# Patient Record
Sex: FEMALE | Race: BLACK OR AFRICAN AMERICAN | NOT HISPANIC OR LATINO | URBAN - METROPOLITAN AREA
[De-identification: names, ages, dates, MRNs, and addresses within clinical notes are randomized per-mention and may not be internally consistent; named-entity substitution may affect disease eponyms.]

---

## 2021-11-07 ENCOUNTER — EMERGENCY (EMERGENCY)
Facility: HOSPITAL | Age: 26
LOS: 0 days | Discharge: HOME | End: 2021-11-07
Payer: MEDICAID

## 2021-11-07 VITALS
SYSTOLIC BLOOD PRESSURE: 117 MMHG | HEART RATE: 84 BPM | OXYGEN SATURATION: 100 % | HEIGHT: 66 IN | RESPIRATION RATE: 15 BRPM | DIASTOLIC BLOOD PRESSURE: 74 MMHG | TEMPERATURE: 98 F | WEIGHT: 160.06 LBS

## 2021-11-07 DIAGNOSIS — Z23 ENCOUNTER FOR IMMUNIZATION: ICD-10-CM

## 2021-11-07 DIAGNOSIS — L02.411 CUTANEOUS ABSCESS OF RIGHT AXILLA: ICD-10-CM

## 2021-11-07 DIAGNOSIS — F12.90 CANNABIS USE, UNSPECIFIED, UNCOMPLICATED: ICD-10-CM

## 2021-11-07 LAB — HCG UR QL: NEGATIVE — SIGNIFICANT CHANGE UP

## 2021-11-07 PROCEDURE — 99284 EMERGENCY DEPT VISIT MOD MDM: CPT

## 2021-11-07 RX ORDER — CEPHALEXIN 500 MG
1 CAPSULE ORAL
Qty: 28 | Refills: 0
Start: 2021-11-07 | End: 2021-11-13

## 2021-11-07 RX ORDER — ACETAMINOPHEN 500 MG
650 TABLET ORAL ONCE
Refills: 0 | Status: COMPLETED | OUTPATIENT
Start: 2021-11-07 | End: 2021-11-07

## 2021-11-07 RX ORDER — TETANUS TOXOID, REDUCED DIPHTHERIA TOXOID AND ACELLULAR PERTUSSIS VACCINE, ADSORBED 5; 2.5; 8; 8; 2.5 [IU]/.5ML; [IU]/.5ML; UG/.5ML; UG/.5ML; UG/.5ML
0.5 SUSPENSION INTRAMUSCULAR ONCE
Refills: 0 | Status: COMPLETED | OUTPATIENT
Start: 2021-11-07 | End: 2021-11-07

## 2021-11-07 RX ORDER — CEPHALEXIN 500 MG
500 CAPSULE ORAL ONCE
Refills: 0 | Status: COMPLETED | OUTPATIENT
Start: 2021-11-07 | End: 2021-11-07

## 2021-11-07 RX ORDER — LIDOCAINE HCL 20 MG/ML
5 VIAL (ML) INJECTION ONCE
Refills: 0 | Status: COMPLETED | OUTPATIENT
Start: 2021-11-07 | End: 2021-11-07

## 2021-11-07 RX ORDER — ACETAMINOPHEN 500 MG
1 TABLET ORAL
Qty: 28 | Refills: 0
Start: 2021-11-07 | End: 2021-11-13

## 2021-11-07 RX ADMIN — TETANUS TOXOID, REDUCED DIPHTHERIA TOXOID AND ACELLULAR PERTUSSIS VACCINE, ADSORBED 0.5 MILLILITER(S): 5; 2.5; 8; 8; 2.5 SUSPENSION INTRAMUSCULAR at 12:06

## 2021-11-07 RX ADMIN — Medication 650 MILLIGRAM(S): at 12:00

## 2021-11-07 RX ADMIN — Medication 5 MILLILITER(S): at 13:26

## 2021-11-07 RX ADMIN — Medication 650 MILLIGRAM(S): at 11:59

## 2021-11-07 RX ADMIN — Medication 500 MILLIGRAM(S): at 11:59

## 2021-11-07 NOTE — ED PROVIDER NOTE - NSFOLLOWUPCLINICS_GEN_ALL_ED_FT
[facility]  General Surgery  [address] [place] [address]  Phone: [phone]  Fax:   Follow Up Time: 1-3 Days    [facility]  General Surgery  [address] [facility] [facility] [address]rd [place] [address]  Phone: [phone]  Fax:   Follow Up Time: 1-3 Days    [facility]  Surgery  [address] [place] [address]  Phone: [phone]  Fax: [phone]  Follow Up Time: 1-3 Days    [facility]  Wound Care  [address] [place] [address]  Phone: [phone]  Fax: [phone]  Follow Up Time: 1-3 Days

## 2021-11-07 NOTE — ED ADULT NURSE NOTE - OBJECTIVE STATEMENT
Patient reports recurring draining abscess with pain to right arm axilla x 2 days. + drainage, denies fevers, chills

## 2021-11-07 NOTE — ED PROVIDER NOTE - NSFOLLOWUPINSTRUCTIONS_ED_ALL_ED_FT
Please schedule to follow up within 3 days for abscess     [facility] - Primary Care   Primary Care  [address] [facility] [place] [address]  Phone: [phone]  Fax:     please take tylenol 325 mg every 4 hours as needed for pain    please take keflex 500 mg 4 times daily for 7 days     please return to the ED for worsening symptoms Please schedule to follow up within 3 days for abscess     [facility]  General Surgery  [address] [place] [address]  Phone: [phone]  Fax:   Follow Up Time: 1-3 Days    [facility]  General Surgery  [address] [facility] - 3rd [place] [address]  Phone: [phone]  Fax:   Follow Up Time: 1-3 Days    [facility]  Surgery  [address] [place] [address]  Phone: [phone]  Fax: [phone]  Follow Up Time: 1-3 Days    [facility]  Wound Care  [address] [place] [address]  Phone: [phone]  Fax: [phone]  Follow Up Time: 1-3 Days    [facility] - Primary Care   Primary Care  [address] [place] [address]  Phone: [phone]  Fax:     please take tylenol 325 mg every 4 hours as needed for pain    please take keflex 500 mg 4 times daily for 7 days     please return to the ED for worsening symptoms

## 2021-11-07 NOTE — ED PROVIDER NOTE - NSFOLLOWUPCLINICSTOKEN_GEN_ALL_ED_FT
629097:1-3 Days|| ||00\01||False;351336:1-3 Days|| ||00\01||False;132014:1-3 Days|| ||00\01||False;933580:1-3 Days|| ||00\01||False;

## 2021-11-07 NOTE — ED PROVIDER NOTE - PHYSICAL EXAMINATION
General appearance AAox3 nontoxic looking afebrile sitting in bed in NAD, breathing comfortable, speak full sentence, no tripoding  Head : NCAT, no facial swelling, no bruise, no laceration, non TTP,  EYE : b/l eyes EOMI/PERRL, no nystagmus.   Mouth : Oropharynx moist and patent, no swelling, no edema, no tongue swelling, uvula midline. no exudate, no rash, no lesion   Neck : no cervical/paracervical spine tenderness, no meningeal signs, no neck rigidity, no lymph node swelling. FROM, no body step off  chest : no bruise, no deformity, non TTP, chest expanding equally b/l, no crepitus, no swelling,   lung : CTAB no w/r/r  abdomen : soft non tender, no distended, + BS x 4 quadrant, no guarding, no CVA tenderness, no organomegaly  spine :  moving all extremity equally b/l skin intact, dry not warm to touch. no bruise, no deformity, no bony step off, no erythema, no swelling, no infection. non TTP. FROM, no sensory deficit, distal pulse intact, cap refill < 2 second, able to bare weight. strength 5/5   left axilla : 4x3 cm localized swelling centrally soft fluctuant with hard surrounding at the base. 1 cm opening at 6 oclock with positive yellow foul odor purulent discharge  skin dry not warm to touch. no bruise, no deformity, no bony step off. FROM, no sensory deficit, distal pulse intact, cap refill < 2 second, strength 5/5

## 2021-11-07 NOTE — ED PROVIDER NOTE - CLINICAL SUMMARY MEDICAL DECISION MAKING FREE TEXT BOX
25 y/o female UCG pending with pMhx right axillary abscess x 5 years ago, chronic marijuana smoker  presented to the ED with complaint of right armpit abscess x 2 day    imp right axillary abscess vs hydradenitis     plan  Ucg pending   tylenol   lidocaine w/o epi   I&D   keflex   tetanus   surgery referral   patient education   reassess

## 2021-11-07 NOTE — ED PROVIDER NOTE - OBJECTIVE STATEMENT
25 y/o female UCG pending with pMhx right axillary abscess x 5 years ago, chronic marijuana smoker  presented to the ED with complaint of right armpit abscess x 2 day with associated purulent drainage. denies fever, chills lost of motion, decrease sensory, numbness, tingling,. patient states " 5 years ago I went to [facility] to the an abscess drained, I cut it open and since then I never return back  for follow up.

## 2021-11-07 NOTE — ED PROVIDER NOTE - PATIENT PORTAL LINK FT
You can access the FollowMyHealth Patient Portal offered by [facility] by registering at the following website: [email or web address]. By joining [employer]’s FollowMyHealth portal, you will also be able to view your health information using other applications (apps) compatible with our system.

## 2021-11-09 ENCOUNTER — EMERGENCY (EMERGENCY)
Facility: HOSPITAL | Age: 26
LOS: 0 days | Discharge: HOME | End: 2021-11-10
Attending: STUDENT IN AN ORGANIZED HEALTH CARE EDUCATION/TRAINING PROGRAM
Payer: MEDICAID

## 2021-11-09 VITALS
OXYGEN SATURATION: 98 % | HEIGHT: 66 IN | HEART RATE: 103 BPM | TEMPERATURE: 98 F | RESPIRATION RATE: 20 BRPM | DIASTOLIC BLOOD PRESSURE: 70 MMHG | SYSTOLIC BLOOD PRESSURE: 106 MMHG | WEIGHT: 160.06 LBS

## 2021-11-09 DIAGNOSIS — Z48.01 ENCOUNTER FOR CHANGE OR REMOVAL OF SURGICAL WOUND DRESSING: ICD-10-CM

## 2021-11-09 PROCEDURE — 99282 EMERGENCY DEPT VISIT SF MDM: CPT

## 2021-11-09 NOTE — ED ADULT NURSE NOTE - NS ED NURSE RECORD ANOTHER VITAL SIGN
Called patient about his appointment with  Dr Darnell Guzman office, could not reach patient about the, appointment left message on patient voice mail, also mailing appointment to patient at current address. Yes

## 2021-11-09 NOTE — ED ADULT NURSE NOTE - OBJECTIVE STATEMENT
pt presented to [facility], AOX4 and ambulatory, with complaints of wound check. Pt states she has minimal pa8in and drainage.

## 2021-11-09 NOTE — ED PROVIDER NOTE - CLINICAL SUMMARY MEDICAL DECISION MAKING FREE TEXT BOX
pt presented for wound check, pt is s/p I and D of a right axillary abscess, no packing in place, area looks clean, no discharge, irrigated with normal saline and covered with 4x 4, pt told to continue to cover and change once daily x 7 days, home care instructions provided

## 2021-11-09 NOTE — ED PROVIDER NOTE - OBJECTIVE STATEMENT
26 year old female presents today for wound check, pt had a right axilla abscess I and D and packing placed, pt states that the packing fell out yesterday, pt denies pain or fever
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
## 2021-11-09 NOTE — ED PROVIDER NOTE - PATIENT PORTAL LINK FT
You can access the FollowMyHealth Patient Portal offered by [facility] by registering at the following website: [email or web address]. By joining [employer]’s FollowMyHealth portal, you will also be able to view your health information using other applications (apps) compatible with our system.

## 2022-06-13 ENCOUNTER — EMERGENCY (EMERGENCY)
Facility: HOSPITAL | Age: 27
LOS: 0 days | Discharge: HOME | End: 2022-06-13
Attending: STUDENT IN AN ORGANIZED HEALTH CARE EDUCATION/TRAINING PROGRAM
Payer: MEDICAID

## 2022-06-13 VITALS
DIASTOLIC BLOOD PRESSURE: 76 MMHG | OXYGEN SATURATION: 100 % | HEIGHT: 66 IN | TEMPERATURE: 98 F | SYSTOLIC BLOOD PRESSURE: 115 MMHG | HEART RATE: 81 BPM | RESPIRATION RATE: 17 BRPM | WEIGHT: 164.91 LBS

## 2022-06-13 DIAGNOSIS — R68.84 JAW PAIN: ICD-10-CM

## 2022-06-13 DIAGNOSIS — K08.89 OTHER SPECIFIED DISORDERS OF TEETH AND SUPPORTING STRUCTURES: ICD-10-CM

## 2022-06-13 DIAGNOSIS — M54.2 CERVICALGIA: ICD-10-CM

## 2022-06-13 PROBLEM — Z78.9 OTHER SPECIFIED HEALTH STATUS: Chronic | Status: ACTIVE | Noted: 2021-11-09

## 2022-06-13 PROCEDURE — 99283 EMERGENCY DEPT VISIT LOW MDM: CPT

## 2022-06-13 RX ORDER — OXYCODONE AND ACETAMINOPHEN 5; 325 MG/1; MG/1
1 TABLET ORAL ONCE
Refills: 0 | Status: DISCONTINUED | OUTPATIENT
Start: 2022-06-13 | End: 2022-06-13

## 2022-06-13 RX ADMIN — OXYCODONE AND ACETAMINOPHEN 1 TABLET(S): 5; 325 TABLET ORAL at 07:44

## 2022-06-13 RX ADMIN — Medication 1 TABLET(S): at 07:43

## 2022-06-13 NOTE — ED PROVIDER NOTE - NSFOLLOWUPCLINICS_GEN_ALL_ED_FT
[facility]  Dental  [address] [place], [place] [address]  Phone: [phone]  Fax:   Follow Up Time: 1-3 Days

## 2022-06-13 NOTE — ED PROVIDER NOTE - PHYSICAL EXAMINATION
GEN: Awake, alert, interactive, NAD.  HEAD AND NECK: NC/AT. Airway patent. Neck supple.   EYES:  Clear b/l. EOMI. PERRL.   ENT: Moist mucus membranes.  CARDIAC: Regular rate, regular rhythm. No evident pedal edema.    RESP/CHEST: Normal respiratory effort with no use of accessory muscles or retractions. Clear throughout on auscultation.  ABD: Soft, non-distended, non-tender. No rebound, no guarding.   BACK: No midline spinal TTP. No CVAT.   EXTREMITIES: Moving all extremities with no apparent deformities.   SKIN: Warm, dry, intact normal color. No rash.   NEURO: AOx3, CN II-XII grossly intact, no focal deficits.   PSYCH: Appropriate mood and affect. GEN: Awake, alert, interactive, NAD.  HEAD AND NECK: NC/AT. Airway patent. Neck supple.   EYES:  Clear b/l. EOMI. PERRL.   ENT: Moist mucus membranes. Prior fillings L lower molars, + TTP tooth # 18, w/o purulent d/c or significant gumline edema. No evidence PTA / sublingual edema. No trismus, no pooling of secretions.   CARDIAC: Regular rate, regular rhythm. No evident pedal edema.    RESP/CHEST: Normal respiratory effort with no use of accessory muscles or retractions. Clear throughout on auscultation.  ABD: Soft, non-distended, non-tender. No rebound, no guarding.   BACK: No midline spinal TTP. No CVAT.   EXTREMITIES: Moving all extremities with no apparent deformities.   SKIN: Warm, dry, intact normal color. No rash.   NEURO: AOx3, CN II-XII grossly intact, no focal deficits.   PSYCH: Appropriate mood and affect.

## 2022-06-13 NOTE — ED PROVIDER NOTE - PATIENT PORTAL LINK FT
You can access the FollowMyHealth Patient Portal offered by [facility] by registering at the following website: [email or web address]. By joining [employer]s FollowMyHealth portal, you will also be able to view your health information using other applications (apps) compatible with our system.

## 2022-06-13 NOTE — ED ADULT NURSE NOTE - OBJECTIVE STATEMENT
left lower dental pain for few weeks. LMP 5/29 cannot get ap-t with dentist, no loose or missing teeth. took advil but no relief pain 8/10.
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
## 2022-06-13 NOTE — ED PROVIDER NOTE - CLINICAL SUMMARY MEDICAL DECISION MAKING FREE TEXT BOX
Otherwise healthy 27F pw intermittent L lower dental pain x 5mo, worse since last night. AF, VSS. Well appearing, in NAD. Exam as noted in PE. Plan: Pain control. Anticipate d/c w/ close outpatient Dental f/u and PO abx. Return signs / symptoms d/w pt, she understands / agrees w/ this plan. Otherwise healthy 27F pw intermittent L lower dental pain x 5mo, worse since last night. AF, VSS. Well appearing, in NAD. Exam as noted in PE. Plan: Pain control. Anticipate d/c w/ close outpatient Dental f/u and PO abx, 5 tablets Percocet PRN severe pain. Return signs / symptoms d/w pt, she understands / agrees w/ this plan.

## 2022-07-18 NOTE — ED ADULT NURSE NOTE - NSICDXNOPASTSURGICALHX_GEN_ALL_CORE
<-- Click to add NO significant Past Surgical History Consent (Scalp)/Introductory Paragraph: The rationale for Mohs was explained to the patient and consent was obtained. The risks, benefits and alternatives to therapy were discussed in detail. Specifically, the risks of changes in hair growth pattern secondary to repair, infection, scarring, bleeding, prolonged wound healing, incomplete removal, allergy to anesthesia, nerve injury and recurrence were addressed. Prior to the procedure, the treatment site was clearly identified and confirmed by the patient. All components of Universal Protocol/PAUSE Rule completed.

## 2022-12-14 ENCOUNTER — EMERGENCY (EMERGENCY)
Facility: HOSPITAL | Age: 27
LOS: 0 days | Discharge: HOME | End: 2022-12-14
Attending: EMERGENCY MEDICINE
Payer: COMMERCIAL

## 2022-12-14 VITALS
WEIGHT: 164.91 LBS | DIASTOLIC BLOOD PRESSURE: 75 MMHG | TEMPERATURE: 98 F | SYSTOLIC BLOOD PRESSURE: 114 MMHG | OXYGEN SATURATION: 97 % | HEART RATE: 96 BPM | HEIGHT: 66 IN | RESPIRATION RATE: 17 BRPM

## 2022-12-14 DIAGNOSIS — M79.89 OTHER SPECIFIED SOFT TISSUE DISORDERS: ICD-10-CM
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
## 2022-12-14 DIAGNOSIS — M79.661 PAIN IN RIGHT LOWER LEG: ICD-10-CM

## 2022-12-14 PROCEDURE — 73590 X-RAY EXAM OF LOWER LEG: CPT | Mod: 26,RT

## 2022-12-14 PROCEDURE — 99284 EMERGENCY DEPT VISIT MOD MDM: CPT

## 2022-12-14 PROCEDURE — 93971 EXTREMITY STUDY: CPT | Mod: 26,RT

## 2022-12-14 PROCEDURE — 99053 MED SERV 10PM-8AM 24 HR FAC: CPT

## 2022-12-14 PROCEDURE — 73562 X-RAY EXAM OF KNEE 3: CPT | Mod: 26,LT

## 2022-12-14 NOTE — ED PROVIDER NOTE - CROS ED CONS ALL NEG
-- DO NOT REPLY / DO NOT REPLY ALL --  -- Message is from the [facility]--    Patient is requesting a medication refill - medication is on active medication list    Patient is currently OUT of the requested medication.    Was Medication Pended?  Yes.    Rx Name and Dose:  Continuous Blood Gluc Sensor (FreeStyle Morenita 14 Day Sensor) Misc , requesting a new prescription     Duration: 30 days    Pharmacy  [address]    Patient confirmed the above pharmacy as correct?  Yes    Does this request need an existing or new prescription at a pharmacy to be sent to a new pharmacy location?   No    Caller Information       Type Contact Phone/Fax    07/08/2022 06:58 PM CDT Phone (Incoming) Darren Fritz (Self) [phone] (M)          Alternative phone number: none     Turnaround time given to caller:   \"This message will be sent to [state Provider's name]. The clinical team will fulfill your request as soon as they review your message when the office opens tomorrow.\"   negative...
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
## 2022-12-14 NOTE — ED PROVIDER NOTE - PATIENT PORTAL LINK FT
You can access the FollowMyHealth Patient Portal offered by [facility] by registering at the following website: [email or web address]. By joining [employer]’s FollowMyHealth portal, you will also be able to view your health information using other applications (apps) compatible with our system.

## 2022-12-14 NOTE — ED PROVIDER NOTE - PHYSICAL EXAMINATION
[place]:  General: No distress.  Mentation at baseline.   HEENT: WNL  Chest/Lungs: CTAB, No wheeze, No retractions, No increased work of breathing, Normal rate  Heart: S1S2 RRR, No M/R/G, Pules equal Bilaterally in upper and lower extremities distally  Abd: soft, NT/ND, No guarding, No rebound.  No hernias, no palpable masses.  Extrem: FROM in all joints, no significant edema noted, No ulcers.  Cap refil < 2sec.  Skin: No rash noted, warm dry.  Neuro:  Grossly normal.  No difficulty ambulating. No focal deficits.  Psychiatric: No evidence of delusions. No SI/HI.

## 2025-09-11 PROBLEM — Z00.00 ENCOUNTER FOR PREVENTIVE HEALTH EXAMINATION: Status: ACTIVE | Noted: 2025-09-11

## 2025-09-15 DIAGNOSIS — F41.9 ANXIETY DISORDER, UNSPECIFIED: ICD-10-CM

## 2025-09-15 RX ORDER — NAPROXEN 500 MG/1
500 TABLET ORAL
Refills: 0 | Status: ACTIVE | COMMUNITY

## 2025-09-15 RX ORDER — BUSPIRONE HYDROCHLORIDE 10 MG/1
10 TABLET ORAL
Refills: 0 | Status: ACTIVE | COMMUNITY

## 2025-09-22 PROBLEM — D50.9 IRON DEFICIENCY ANEMIA, UNSPECIFIED IRON DEFICIENCY ANEMIA TYPE: Status: ACTIVE | Noted: 2025-09-22

## 2025-09-22 PROBLEM — R07.89 CHEST PAIN, ATYPICAL: Status: ACTIVE | Noted: 2025-09-22
